# Patient Record
Sex: FEMALE | Race: WHITE
[De-identification: names, ages, dates, MRNs, and addresses within clinical notes are randomized per-mention and may not be internally consistent; named-entity substitution may affect disease eponyms.]

---

## 2017-07-18 ENCOUNTER — HOSPITAL ENCOUNTER (OUTPATIENT)
Dept: HOSPITAL 45 - C.MAMM | Age: 57
Discharge: HOME | End: 2017-07-18
Attending: OBSTETRICS & GYNECOLOGY
Payer: COMMERCIAL

## 2017-07-18 DIAGNOSIS — Z12.31: Primary | ICD-10-CM

## 2017-07-18 NOTE — MAMMOGRAPHY REPORT
BILATERAL DIGITAL SCREENING MAMMOGRAM TOMOSYNTHESIS WITH CAD: 7/18/2017

CLINICAL HISTORY: Routine screening.  Patient has no complaints.  





TECHNIQUE:  Breast tomosynthesis in addition to standard 2D mammography was performed. Current study 
was also evaluated with a Computer Aided Detection (CAD) system.  



COMPARISON: Comparison is made to exams dated:  4/26/2016 mammogram, 4/21/2015 mammogram, 4/17/2014 m
ammogram, 4/11/2013 mammogram, 3/23/2011 mammogram, and 3/22/2010 mammogram - [facility]
enter.   



BREAST COMPOSITION:  There are scattered areas of fibroglandular density in both breasts.  



FINDINGS:  The parenchymal pattern is unchanged. No developing mass, architectural distortion or clus
ter of suspicious microcalcifications is seen in either breast.  





IMPRESSION:  ACR BI-RADS CATEGORY 2: BENIGN

There is no mammographic evidence of malignancy. A 1 year screening mammogram is recommended.  The pa
tient will receive written notification of the results.  





Approximately 10% of breast cancers are not detected with mammography. A negative mammographic report
 should not delay biopsy if a clinically suggestive mass is present.



Zeina Angel M.D.          

ay/:7/18/2017 15:00:35  



Imaging Technologist: Berenice Wisdom, [facility]

letter sent: Normal 1/2  

BI-RADS Code: ACR BI-RADS Category 2: Benign

## 2017-08-28 ENCOUNTER — HOSPITAL ENCOUNTER (OUTPATIENT)
Dept: HOSPITAL 45 - C.LAB | Age: 57
Discharge: HOME | End: 2017-08-28
Attending: INTERNAL MEDICINE
Payer: COMMERCIAL

## 2017-08-28 DIAGNOSIS — E03.9: Primary | ICD-10-CM

## 2017-08-28 LAB — TSH SERPL-ACNC: 0.95 UIU/ML (ref 0.3–4.5)

## 2017-10-03 ENCOUNTER — HOSPITAL ENCOUNTER (OUTPATIENT)
Dept: HOSPITAL 45 - C.RAD1850 | Age: 57
Discharge: HOME | End: 2017-10-03
Attending: EMERGENCY MEDICINE
Payer: COMMERCIAL

## 2017-10-03 DIAGNOSIS — S62.662A: Primary | ICD-10-CM
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
## 2017-10-03 NOTE — DIAGNOSTIC IMAGING REPORT
R FINGER(S) MIN 2 VIEWS ROUTINE



HISTORY:  57 years-old Female RIGHT MIDDLE FINGER acute crush injury of the

right third finger



COMPARISON: None available



TECHNIQUE: 3 views of the right third digit



FINDINGS: 

There is soft tissue swelling with subcutaneous emphysema involving the distal

third digit both volarly and dorsally with acute nondisplaced transverse

fracture of the distal phalangeal tuft. No opaque foreign body.



IMPRESSION: Acute nondisplaced third distal phalangeal tuft fracture with soft

tissue swelling and subcutaneous emphysema suggesting penetrating trauma.







The above report was generated using voice recognition software. It may contain

grammatical, syntax or spelling errors.







Electronically signed by:  Clay Larson M.D.

10/3/2017 1:42 PM



Dictated Date/Time:  10/3/2017 1:41 PM

## 2017-10-10 ENCOUNTER — HOSPITAL ENCOUNTER (OUTPATIENT)
Dept: HOSPITAL 45 - C.PAPS | Age: 57
Discharge: HOME | End: 2017-10-10
Attending: OBSTETRICS & GYNECOLOGY
Payer: COMMERCIAL

## 2017-10-10 DIAGNOSIS — Z01.419: Primary | ICD-10-CM

## 2018-02-27 ENCOUNTER — HOSPITAL ENCOUNTER (OUTPATIENT)
Dept: HOSPITAL 45 - C.RADBC | Age: 58
Discharge: HOME | End: 2018-02-27
Attending: INTERNAL MEDICINE
Payer: COMMERCIAL

## 2018-02-27 DIAGNOSIS — M54.6: Primary | ICD-10-CM

## 2018-02-27 NOTE — DIAGNOSTIC IMAGING REPORT
THORACOLUMBAR SPINE 2 VIEWS



CLINICAL HISTORY: M[age] Chronic bilateral thoracic back [id number]   



COMPARISON STUDY:  None.



FINDINGS: Mild dextroscoliosis of the lumbar spine. Mild disc space narrowing at

L2-L3 with small endplate osteophytes. Mild degenerative disc disease within the

majority of the thoracic spine. Minimal anterior wedging at L1 is likely within

the range of normal limits. No fracture. No subluxation. Paraspinal soft tissues

are unremarkable.



IMPRESSION:  

1. No fracture or subluxation within the thoracolumbar spine.

2. Mild dextroscoliosis of the lumbar spine.

3. Mild degenerative changes. 





 







Electronically signed by:  Madi Armas M.D.

2/27/2018 4:04 PM



Dictated Date/Time:  2/27/2018 3:59 PM

## 2018-08-08 ENCOUNTER — HOSPITAL ENCOUNTER (OUTPATIENT)
Dept: HOSPITAL 45 - C.MAMM | Age: 58
End: 2018-08-08
Attending: OBSTETRICS & GYNECOLOGY
Payer: COMMERCIAL

## 2018-08-08 DIAGNOSIS — Z12.31: Primary | ICD-10-CM

## 2018-08-09 NOTE — MAMMOGRAPHY REPORT
BILATERAL DIGITAL SCREENING MAMMOGRAM TOMOSYNTHESIS WITH CAD: 8/8/2018

CLINICAL HISTORY: Routine screening. Patient has no complaints.  





TECHNIQUE: The study was acquired using full field digital technology and interpreted from soft copy.
 Breast tomosynthesis in addition to standard 2D mammography was performed. Current study was also ev
aluated with a Computer Aided Detection (CAD) system.  



COMPARISON: Comparison is made to exams dated:  7/18/2017 mammogram, 4/26/2016 mammogram, 4/21/2015 m
ammogram, 4/11/2013 mammogram, 4/4/2012 mammogram, and 3/23/2011 mammogram - [facility]
nter.   

BREAST COMPOSITION: There are scattered areas of fibroglandular density in both breasts.  



FINDINGS: 

The parenchymal pattern is unchanged. No developing mass, architectural distortion or cluster of susp
icious microcalcifications is seen in either breast.  





IMPRESSION: ACR BI-RADS CATEGORY 2: BENIGN

There is no mammographic evidence of malignancy. A 1 year screening mammogram is recommended.(08/09/2
019)  The patient will receive written notification of the results.  





Some breast cancers are not detected with mammography. A negative mammographic report should not lamar
y biopsy if a clinically suggestive mass is present.



Zeina Angel M.D.          

ay/:8/8/2018 16:12:26  



Imaging Technologist: RT Berenice(KAIN)(M), [facility]

letter sent: Normal 1/2  

BI-RADS Code: ACR BI-RADS Category 2: Benign